# Patient Record
Sex: FEMALE | Race: WHITE | NOT HISPANIC OR LATINO | URBAN - METROPOLITAN AREA
[De-identification: names, ages, dates, MRNs, and addresses within clinical notes are randomized per-mention and may not be internally consistent; named-entity substitution may affect disease eponyms.]

---
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
## 2025-09-18 ENCOUNTER — APPOINTMENT (OUTPATIENT)
Dept: PEDIATRICS | Facility: CLINIC | Age: 20
End: 2025-09-18
Payer: COMMERCIAL

## 2025-09-18 VITALS — HEART RATE: 96 BPM | OXYGEN SATURATION: 100 % | TEMPERATURE: 99.1 F | WEIGHT: 122.36 LBS

## 2025-09-18 DIAGNOSIS — Z87.2 PERSONAL HISTORY OF DISEASES OF THE SKIN AND SUBCUTANEOUS TISSUE: ICD-10-CM

## 2025-09-18 DIAGNOSIS — Z23 ENCOUNTER FOR IMMUNIZATION: ICD-10-CM

## 2025-09-18 DIAGNOSIS — J06.9 ACUTE UPPER RESPIRATORY INFECTION, UNSPECIFIED: ICD-10-CM

## 2025-09-18 DIAGNOSIS — J02.9 ACUTE PHARYNGITIS, UNSPECIFIED: ICD-10-CM

## 2025-09-18 PROCEDURE — G2211 COMPLEX E/M VISIT ADD ON: CPT | Mod: NC

## 2025-09-18 PROCEDURE — 90656 IIV3 VACC NO PRSV 0.5 ML IM: CPT

## 2025-09-18 PROCEDURE — 87811 SARS-COV-2 COVID19 W/OPTIC: CPT | Mod: QW

## 2025-09-18 PROCEDURE — 90471 IMMUNIZATION ADMIN: CPT

## 2025-09-18 PROCEDURE — 87880 STREP A ASSAY W/OPTIC: CPT | Mod: QW

## 2025-09-18 PROCEDURE — 99213 OFFICE O/P EST LOW 20 MIN: CPT | Mod: 25

## 2025-09-22 PROBLEM — U07.1 COVID-19 VIRUS DETECTED: Status: ACTIVE | Noted: 2025-09-22

## 2025-09-22 PROBLEM — R76.8 COVID-19 VIRUS ANTIBODY DETECTED: Status: ACTIVE | Noted: 2025-09-22

## 2025-09-22 LAB — BACTERIA THROAT CULT: NORMAL
